# Patient Record
Sex: FEMALE | Race: WHITE | NOT HISPANIC OR LATINO | URBAN - METROPOLITAN AREA
[De-identification: names, ages, dates, MRNs, and addresses within clinical notes are randomized per-mention and may not be internally consistent; named-entity substitution may affect disease eponyms.]

---

## 2017-03-17 ENCOUNTER — ALLSCRIPTS OFFICE VISIT (OUTPATIENT)
Dept: OTHER | Facility: OTHER | Age: 52
End: 2017-03-17

## 2017-03-17 DIAGNOSIS — R53.83 OTHER FATIGUE: ICD-10-CM

## 2017-03-17 DIAGNOSIS — K57.32 DIVERTICULITIS OF LARGE INTESTINE WITHOUT PERFORATION OR ABSCESS WITHOUT BLEEDING: ICD-10-CM

## 2017-03-17 DIAGNOSIS — F31.9 BIPOLAR DISORDER (HCC): ICD-10-CM

## 2017-03-17 DIAGNOSIS — R51.9 HEADACHE: ICD-10-CM

## 2017-03-17 DIAGNOSIS — I10 ESSENTIAL (PRIMARY) HYPERTENSION: ICD-10-CM

## 2018-01-11 NOTE — RESULT NOTES
Verified Results  (1) URINALYSIS w URINE C/S REFLEX (will reflex a microscopy if leukocytes, occult blood, or nitrites are not within normal limits) [id number] 04:04PM Sheree Majestic Order Number: [id number]     Test Name Result Flag Reference   COLOR Yellow     CLARITY Clear     PH UA 6 0  4 5-8 0   LEUKOCYTE ESTERASE UA Negative  Negative   NITRITE UA Negative  Negative   PROTEIN UA Negative mg/dl  Negative   GLUCOSE UA Negative mg/dl  Negative   KETONES UA Negative mg/dl  Negative   UROBILINOGEN UA 0 2 E U /dl  0 2, 1 0 E U /dl   BILIRUBIN UA Negative  Negative   BLOOD UA Negative  Negative   SPECIFIC GRAVITY UA 1 019  1 003-1 030

## 2018-01-12 VITALS
HEIGHT: 64 IN | RESPIRATION RATE: 16 BRPM | HEART RATE: 84 BPM | DIASTOLIC BLOOD PRESSURE: 78 MMHG | TEMPERATURE: 98.9 F | WEIGHT: 183.5 LBS | BODY MASS INDEX: 31.33 KG/M2 | SYSTOLIC BLOOD PRESSURE: 122 MMHG